# Patient Record
(demographics unavailable — no encounter records)

---

## 2017-05-30 NOTE — PDOC
History of Present Illness





<Heidi Friedman - Last Filed: 17 22:48>





- History of Present Illness


Initial Comments: 





17 23:09


Patient is a 30 year old pregnant female (12 weeks) with no significant medical 

hx who is presenting to the ED with cramping and vaginal spotting since this 

morning. The patient states she noticed brownish-reddish blood when she wiped. 

She did not pass any clots or saturate any pads. The patient also complains of 

some abdominal cramping. Her next OB/GYN appointment is on 17. Denies 

nausea, vomiting, diarrhea, fever, chills, or urinary complaints.





OB: Etelvina Nolen CNM


LNMP: 2017; 


Surgical Hx: Denies 


Allergies: NKDA





<Tia Hebert - Last Filed: 17 23:13>





- General


Chief Complaint: Vaginal Bleeding


Stated Complaint: VAGINAL BLEEDING/12 WKS PREGNANT


Time Seen by Provider: 17 18:55





Past History





- Past Medical History


Other medical history: denies





- Psycho/Social/Smoking Cessation Hx


Suicidal Ideation: No


Smoking History: Never smoked


Information on smoking cessation initiated: No


Hx Alcohol Use: No


Drug/Substance Use Hx: No


Substance Use Type: None





<Heidi Friedman - Last Filed: 17 22:48>





<Tia Hebert - Last Filed: 17 23:13>





- Past Medical History


Allergies/Adverse Reactions: 


 Allergies











Allergy/AdvReac Type Severity Reaction Status Date / Time


 


No Known Allergies Allergy   Verified 17 18:35











Home Medications: 


Ambulatory Orders





Ferrous Sulfate 325 mg PO DAILY 17 


Folic Acid 1 mg PO DAILY 17 











**Review of Systems





- Review of Systems


Comments:: 





17 23:12


CONSTITUTIONAL:


Absent: fever, chills, diaphoresis, generalized weakness, malaise, loss of 

appetite


HEENT:


Absent: rhinorrhea, nasal congestion, throat pain, throat swelling, difficulty 

swallowing, mouth swelling, ear pain, eye pain, visual changes


CARDIOVASCULAR: 


Absent: chest pain, syncope, palpitations, irregular heart rate, lightheadedness

, peripheral edema


RESPIRATORY: 


Absent: cough, shortness of breath, dyspnea with exertion, orthopnea, wheezing, 

stridor, hemoptysis


GASTROINTESTINAL:


Present: abdominal cramping


Absent: abdominal distension, nausea, vomiting, diarrhea, constipation, melena, 

hematochezia


GENITOURINARY: 


Present: vaginal bleeding


Absent: dysuria, frequency, urgency, hesitancy, hematuria, flank pain, genital 

pain


MUSCULOSKELETAL: 


Absent: myalgia, arthralgia, joint swelling


SKIN: 


Absent: rash, itching, pallor


HEMATOLOGIC/IMMUNOLOGIC: 


Absent: easy bleeding, easy bruising, lymphadenopathy, frequent infections


ENDOCRINE:


Absent: unexplained weight gain, unexplained weight loss, heat intolerance, 

cold intolerance


NEUROLOGIC: 


Absent: headache, focal weakness or paresthesia, dizziness, unsteady gait, 

seizure, mental status changes, bladder or bowel incontinence.


PSYCHIATRIC: 


Absent: anxiety, depression, suicidal or homicidal ideation, hallucinations








<Tia Hebert - Last Filed: 17 23:13>





*Physical Exam





- Vital Signs


 Last Vital Signs











Temp Pulse Resp BP Pulse Ox


 


 97.6 F   69   17   122/73   98 


 


 17 18:33  17 18:33  17 18:33  17 18:33  17 18:33














<Heidi Friedman - Last Filed: 17 22:48>





- Vital Signs


 Last Vital Signs











Temp Pulse Resp BP Pulse Ox


 


 97.6 F   69   17   122/73   98 


 


 17 18:33  17 18:33  17 18:33  17 18:33  17 18:33














- Physical Exam


Comments: 





17 23:12


GENERAL:


Well developed, well nourished. Awake and alert. No acute distress.


HEENT:


Normocephalic, atraumatic. PERRLA, EOMI. No conjunctival pallor. Sclera are non-

icteric. Moist mucous membranes. Oropharynx is clear.


NECK: 


Supple. Full ROM. No JVD. Carotid pulses 2+ and symmetric, without bruits. No 

thyromegaly. No 


lymphadenopathy.


CARDIOVASCULAR:


Regular rate and rhythm. No murmurs, rubs, or gallops. Distal pulses are 2+ and 

symmetric. 


PULMONARY: 


No evidence of respiratory distress. Lungs clear to auscultation bilaterally. 

No wheezing, rales or rhonchi.


ABDOMINAL:


Soft. Non-tender. Non-distended. No rebound or guarding. No organomegaly. 

Normoactive bowel sounds. 


MUSCULOSKELETAL: 


Normal range of motion at all joints. No bony deformities or tenderness. No CVA 

tenderness.


EXTREMITIES: 


No cyanosis. No clubbing. No edema. No calf tenderness.


SKIN: 


Warm and dry. Normal capillary refill. No rashes. No jaundice. 


NEUROLOGICAL: 


Alert, awake, appropriate. Cranial nerves 2-12 intact. Normal speech. Gait is 

normal without ataxia.


PSYCHIATRIC: 


Cooperative. Good eye contact. Appropriate mood and affect.








<Tia Hebert - Last Filed: 17 23:13>





ED Treatment Course





- LABORATORY


CBC & Chemistry Diagram: 


 17 19:30








<Heidi Friedman - Last Filed: 17 22:48>





- LABORATORY


CBC & Chemistry Diagram: 


 17 19:30








- ADDITIONAL ORDERS


Additional order review: 


 Laboratory  Results











  17





  19:30 19:30 19:15


 


INR   1.00 


 


Beta HCG, Quant    13602.0


 


Urine Color  Straw  


 


Urine Appearance  Clear  


 


Urine pH  5.0  


 


Urine Protein  Negative  


 


Urine Glucose (UA)  Negative  


 


Urine Ketones  Negative  


 


Urine Blood  Negative  


 


Urine Nitrite  Negative  


 


Urine Bilirubin  Negative  


 


Urine Urobilinogen  Negative  


 


Ur Leukocyte Esterase  Negative  


 


Blood Type   


 


Antibody Screen   














  17





  19:00


 


INR 


 


Beta HCG, Quant 


 


Urine Color 


 


Urine Appearance 


 


Urine pH 


 


Urine Protein 


 


Urine Glucose (UA) 


 


Urine Ketones 


 


Urine Blood 


 


Urine Nitrite 


 


Urine Bilirubin 


 


Urine Urobilinogen 


 


Ur Leukocyte Esterase 


 


Blood Type  O POSITIVE


 


Antibody Screen  Negative








 











  17





  19:30


 


RBC  4.32


 


MCV  91.2


 


MCHC  33.7


 


RDW  13.2


 


MPV  9.2


 


Neutrophils %  73.2


 


Lymphocytes %  18.8


 


Monocytes %  6.4


 


Eosinophils %  1.2


 


Basophils %  0.4














- RADIOLOGY


Radiograph Interpretation: 





17 20:53


Transvaginal US


Impression: Single intrauterine gestation without cardiac activity suggestive 

of embryonic demise.


Correlate with beta hCG levels and possibly follow-up sonography.


Reported By: Ronnell Shaffer MD





<Tia Hebert - Last Filed: 17 23:13>





*DC/Admit/Observation/Transfer





<Heidi Friedman - Last Filed: 17 22:48>





- Attestations


Scribe Attestion: 


17 23:12


Documentation prepared by Tia Hebert, acting as medical scribe for Heidi Friedman MD.





<Tia Hebert - Last Filed: 17 23:13>


Diagnosis at time of Disposition: 


 Threatened  in early pregnancy





- Discharge Dispostion


Disposition: HOME


Condition at time of disposition: Stable





- Referrals


Referrals: 


Etelvina Nolen MD [Primary Care Provider] - 





- Patient Instructions


Additional Instructions: 


please see your ob/gyn to have another ultrasound in one week


Print Language: Albanian

## 2017-05-31 NOTE — PDOC
History of Present Illness





- General


Chief Complaint: Vaginal Bleeding


Stated Complaint: BLEEDING (6 WKS PREGNANT)


Time Seen by Provider: 17 12:16


History Source: Patient





- History of Present Illness


Timing/Duration: reports: getting worse





Past History





- Past Medical History


Allergies/Adverse Reactions: 


 Allergies











Allergy/AdvReac Type Severity Reaction Status Date / Time


 


No Known Allergies Allergy   Verified 17 11:54











Home Medications: 


Ambulatory Orders





Ferrous Sulfate 325 mg PO DAILY 17 


Folic Acid 1 mg PO DAILY 17 








Other medical history: NONE





- Reproductive History


 (#): 2


Para: 1





- Psycho/Social/Smoking Cessation Hx


Anxiety: No


Suicidal Ideation: No


Smoking History: Never smoked


Hx Alcohol Use: No


Drug/Substance Use Hx: No


Substance Use Type: None





**Review of Systems





- Review of Systems


Constitutional: No: Chills, Fever


ABD/GI: Yes: Abdominal cramping.  No: Nausea, Vomiting


: No: Dysuria


Musculoskeletal: No: Back Pain





*Physical Exam





- Vital Signs


 Last Vital Signs











Temp Pulse Resp BP Pulse Ox


 


 98.3 F   72   20   117/73   100 


 


 17 11:51  17 11:51  17 11:51  17 11:51  17 11:51














- Physical Exam


General Appearance: Yes: Appropriately Dressed.  No: Apparent Distress


HEENT: positive: Normal Voice


Neck: positive: Supple


Respiratory/Chest: negative: Respiratory Distress


Female Pelvic Exam: positive: cervical os closed, vaginal bleeding (w/ clots.).

  negative: normal adnexa, CMT


Gastrointestinal/Abdominal: positive: Tender (to mid suprapubic), Soft


Musculoskeletal: negative: CVA Tenderness


Integumentary: positive: Dry, Warm


Neurologic: positive: Fully Oriented, Alert, Normal Mood/Affect





Medical Decision Making





- Medical Decision Making


17 12:40


30-year-old female, , approximately 6 weeks pregnant, here with vaginal 

bleeding.  Patient seen in ED for same yesterday and had an ultrasound showing 

6 weeks IUP with no cardiac activity, possible fetal demise with beta of 18,

000.  UA within normal limits.  Patient states she has an appointment with her 

OB tomorrow, but returns today because bleeding has worsened and now has clots.

  Also complaining of some lower abdominal cramping.  No dysuria, nausea, 

vomiting, fever or chills.





See exam





Most likely threatened AB w/ worsening vag bleed


+IUP on US yesterday w/ no FHR, beta 18K, RH +, UA neg


Has OB appt tomorrow


Stable in ED w/ moderate vag bleed/clots


M/l can be discharged w/ OB f/u tomorrow, will discuss further intervention w/ 

ED attg








17 12:55


+IUP w/ no cardiac activity on bedside US. Pt aware and will f/u with her OB 

tomorrow





17 12:56








*DC/Admit/Observation/Transfer


Diagnosis at time of Disposition: 


 Threatened  in early pregnancy





- Discharge Dispostion


Disposition: HOME


Condition at time of disposition: Stable





- Patient Instructions


Printed Discharge Instructions:  Threatened 


Additional Instructions: 


Please follow up with your OB tomorrow

## 2017-11-11 NOTE — PDOC
History of Present Illness





- General


History Source: Patient


Exam Limitations: No Limitations





- History of Present Illness


Initial Comments: 





17 19:22


The patient is a 30 year old female, , with no significant past medical 

history who presents to the ED for possible pregnancy. The patient came into 

the ED on 17 for vaginal bleeding with pregnancy. US was done that showed 

a 5 week 6 day live intrauterine pregnancy. Patient states the vaginal bleeding 

did not reside and she visited [facility] ED on 17. Patient had an US done at 

[facility] that was unremarkable but did not comment on the pregnancy. She states 

the vaginal bleeding stopped on 17. She denies any blood clots or 

abdominal cramping, and describes the vaginal bleeding as a normal menstrual 

period. Patient comes into the ED today because she took a urine pregnancy test 

at home that showed positive results and wants to confirm or deny possible 

pregnancy. Patient does not have any complaints upon arrival to the ED. 


Patient states she had an  earlier this year with abdominal cramping 

and large blood clots but states she does not presently display this symptoms. 

She notes her last menstrual period was on 9/10/17.


Denies fever or chills. Denies any other symptoms. 














<Lisa Pan - Last Filed: 17 21:21>





- General


History Source: Patient


Exam Limitations: No Limitations





<Dilip Henson - Last Filed: 17 21:33>





- General


Chief Complaint: Pregnancy,Possible


Stated Complaint: PREGNANCY


Time Seen by Provider: 17 18:40





Past History





<Lisa Pan - Last Filed: 17 21:21>





- Past Medical History


COPD: No





- Reproductive History


 (#): 2


Para: 1


Cervical CA: No


Dysfunctional Uterine Bleeding: No


Ectopic Pregnancy: No


Endometrial CA: No


Polycystic Ovaries: No


Tubal Ligation: No





- Suicide/Smoking/Psychosocial Hx


Smoking History: Never smoked


Hx Alcohol Use: No


Drug/Substance Use Hx: No


Substance Use Type: None





<Dilip Henson - Last Filed: 17 21:33>





- Past Medical History


Allergies/Adverse Reactions: 


 Allergies











Allergy/AdvReac Type Severity Reaction Status Date / Time


 


No Known Allergies Allergy   Verified 17 18:16











Home Medications: 


Ambulatory Orders





Ferrous Sulfate 325 mg PO DAILY 17 


Folic Acid 1 mg PO DAILY 17 











**Review of Systems





- Review of Systems


Able to Perform ROS?: Yes


Comments:: 





17 19:22


GENERAL/CONSTITUTIONAL: No fever or chills. No weakness.


HEAD, EYES, EARS, NOSE AND THROAT: No change in vision. No ear pain or 

discharge. No sore throat.


CARDIOVASCULAR: No chest pain or shortness of breath.


RESPIRATORY: No cough, wheezing, or hemoptysis.


GASTROINTESTINAL: No nausea, vomiting, diarrhea or constipation.


GENITOURINARY: + possible pregnancy. No dysuria, frequency, or change in 

urination.


MUSCULOSKELETAL: No joint or muscle swelling or pain. No neck or back pain.


SKIN: No rash


NEUROLOGIC: No headache, vertigo, loss of consciousness, or change in strength/

sensation.


ENDOCRINE: No increased thirst. No abnormal weight change.


HEMATOLOGIC/LYMPHATIC: No anemia, easy bleeding, or history of blood clots.


ALLERGIC/IMMUNOLOGIC: No hives or skin allergy.











All Other Systems: Reviewed and Negative





<Lisa Pan - Last Filed: 17 21:21>





*Physical Exam





- Vital Signs


 Last Vital Signs











Temp Pulse Resp BP Pulse Ox


 


 98.3 F   70   18   126/67   99 


 


 17 18:17  17 18:17  17 18:17  17 18:17  17 18:17














- Physical Exam


Comments: 





17 19:22


GENERAL: Awake, alert, and fully oriented, in no acute distress


HEAD: No signs of trauma


EYES: PERRLA, EOMI, sclera anicteric, conjunctiva clear


ENT: Auricles normal inspection, hearing grossly normal, nares patent, 

oropharynx clear without exudates. Moist mucosa


NECK: Normal ROM, supple, no lymphadenopathy, JVD, or masses


LUNGS: Breath sounds equal, clear to auscultation bilaterally.  No wheezes, and 

no crackles


HEART: Regular rate and rhythm, normal S1 and S2, no murmurs, rubs or gallops


ABDOMEN: Soft, nontender, normoactive bowel sounds.  No guarding, no rebound.  

No masses


EXTREMITIES: Normal range of motion, no edema.  No clubbing or cyanosis. No 

cords, erythema, or tenderness


NEUROLOGICAL:  Normal speech


SKIN: Warm, Dry, normal turgor, no rashes or lesions noted.





<Lisa Pan - Last Filed: 17 21:21>





- Vital Signs


 Last Vital Signs











Temp Pulse Resp BP Pulse Ox


 


 98.3 F   70   18   126/67   99 


 


 17 18:17  17 18:17  17 18:17  17 18:17  17 18:17














<Dilip Henson - Last Filed: 17 21:33>





ED Treatment Course





- LABORATORY


CBC & Chemistry Diagram: 


 17 19:50








- RADIOLOGY


Radiograph Interpretation: 





17 21:21


US OB LIMITED 


Impression: there is a cystic structure within the endometrial canal measuring 

approximately 8mm without fetal pole or yolk sac compatible with failed early 

pregnancy 


Reported by: Imaging on call 





<Lisa Pan - Last Filed: 17 21:21>





- LABORATORY


CBC & Chemistry Diagram: 


 17 19:50








- RADIOLOGY


Radiology Studies Ordered: 














 Category Date Time Status


 


 TRANSVAGINAL US PREG [US] Stat Ultrasound  17 18:46 Ordered














<Dilip Henson - Last Filed: 17 21:33>





Medical Decision Making





- Medical Decision Making





17 19:10





A portion of this note was documented by scribe services under my direction. I 

have reviewed the details of the note, within reason, and agree with the 

documentation with the following case summary and management plan written by 

me. 





Patient treated in the ED.





Nursing notes are reviewed and incorporated into the medical decision-making.


Vital signs reviewed.





Peripheral IV access obtained by the nurse, laboratory studies are drawn and 

sent, reviewed and interpreted by myself. 





 Vital Signs











Temp Pulse Resp BP Pulse Ox


 


 98.3 F   70   18   126/67   99 


 


 17 18:17  17 18:17  17 18:17  17 18:17  17 18:17








30-year-old female with no past medical history,  approximately 6 weeks 

pregnant presents to the ED for recheck of her pregnancy. Patient was here 

several days ago and was evaluated for vaginal bleeding and was instructed that 

she had threatened miscarriage. She then 3 days ago went to [facility] with a repeat ultrasound and demonstrated no findings on the pelvic 

ultrasound. However, patient continued to have positive pregnancy test today 

but denies any abdominal pain or passage of blood clots or vaginal bleeding and 

wanted to get a checkup again.





The patient's ultrasound report was reviewed from [facility]. 

They do not comment anything about a pregnancy. We'll repeat a beta hCG and a 

pelvic ultrasound. At this time, there is no vaginal bleeding or symptoms at 

this time.


17 21:30





 CBC, BMP





 17 19:50 





 CMP











Beta HCG, Quant  117.1 mIU/ml  17  19:30    








Ultrasound shows likely failed early pregnancy.





The patient's beta-hCG was noted to be downtrending since her previous visit. 

Findings are consistent with likely miscarriage. Patient is asymptomatic and 

without bleeding. Patient is clear for discharge and follow-up with her 

obstetrician.





Return precautions were given including severe abdominal pain and persistent 

vaginal bleeding. Patient verbalizes agrees with plan.





I discussed the physical exam findings, ancillary test results and final 

diagnoses with the patient.  I answered all of the patient's questions.  The 

patient was satisfied with the care received and felt comfortable with the 

discharge plan and treatment plan.  The patient will call their primary care 

physician within 24 hours to arrange follow-up and will return to the Emergency 

Department with any new, persistant or worsening symptoms. 





<Dilip Henson - Last Filed: 17 21:33>





*DC/Admit/Observation/Transfer





- Attestations


Scribe Attestion: 





17 19:22





Documentation prepared by Lisa Pan, acting as medical scribe for Dilip Henson MD





<Lisa Pan - Last Filed: 17 21:21>





- Discharge Dispostion


Admit: No





<Dilip Henson - Last Filed: 17 21:33>


Diagnosis at time of Disposition: 


 Miscarriage








- Discharge Dispostion


Disposition: HOME


Condition at time of disposition: Stable





- Referrals


Referrals: 


Dania Reyes MD [Primary Care Provider] - 





- Patient Instructions


Printed Discharge Instructions:  DI for Miscarriage


Additional Instructions: 





You likely had a miscarriage. Please follow-up with her OB/GYN doctor. If you 

have uncontrollable abdominal pain or persistent bleeding in the vagina, return 

to the ER for further evaluation.


Print Language: Telugu





- Post Discharge Activity